# Patient Record
Sex: MALE | Race: OTHER | Employment: FULL TIME | URBAN - METROPOLITAN AREA
[De-identification: names, ages, dates, MRNs, and addresses within clinical notes are randomized per-mention and may not be internally consistent; named-entity substitution may affect disease eponyms.]

---

## 2023-10-24 RX ORDER — PRAVASTATIN SODIUM 40 MG
40 TABLET ORAL DAILY
COMMUNITY
Start: 2023-08-02

## 2023-10-24 RX ORDER — LOSARTAN POTASSIUM 100 MG/1
1 TABLET ORAL DAILY
COMMUNITY
Start: 2023-05-04

## 2023-10-24 RX ORDER — PANTOPRAZOLE SODIUM 40 MG/1
1 TABLET, DELAYED RELEASE ORAL DAILY
COMMUNITY
Start: 2022-12-12

## 2023-10-24 RX ORDER — TAMSULOSIN HYDROCHLORIDE 0.4 MG/1
1 CAPSULE ORAL DAILY
COMMUNITY
Start: 2023-05-08

## 2023-10-24 NOTE — FLOWSHEET NOTE
Preoperative Screening for Elective Surgery/Invasive Procedures While COVID-19 present in the community     Have you tested positive or have been told to self-isolate for COVID-19 like symptoms within the past 28 days? Do you currently have any of the following symptoms? Fever >100.0 F or 99.9 F in immunocompromised patients? New onset cough, shortness of breath or difficulty breathing? New onset sore throat, myalgia (muscle aches and pains), headache, loss of taste/smell or diarrhea? Have you had a potential exposure to COVID-19 within the past 14 days by:  Close contact with a confirmed case? Close contact with a healthcare worker,  or essential infrastructure worker (grocery store, [employer], gas station, public utilities or transportation)? Do you reside in a congregate setting such as; skilled nursing facility, adult home, correctional facility, homeless shelter or other institutional setting? Have you had recent travel to a known COVID-19 hotspot? No to all above       * Admitted with diarrhea? [] YES    [x]  NO     *Prior history of C-Diff. In last 3 months? [] YES    [x]  NO     *Antibiotic use in the past 6-8 weeks? [x]  NO    []  YES      If yes, which: REASON_________________     *Prior hospitalization or nursing home in the last month? []  YES    [x]  NO     [facility] .call before you fall    [address] [place]    Day of Surgery:     11/3/23             Arrival time__1030__________        Surgery time___1200_________    Do not eat  anything after 12:00 midnight prior to your surgery. Nothing to drink within 5 hours of procedure. This includes water chewing gum, mints and ice chips- the Day of Surgery.   You may brush your teeth and gargle the morning of your surgery, but do not swallow the water      You may be asked to stop blood thinners such as Coumadin, Plavix, Fragmin, Lovenox, etc., or any anti-inflammatories such as:  Aspirin, Ibuprofen, Advil, Naproxen prior to your surgery. You may also be asked to stop any OTC medications such as fish oil, vitamin E, glucosamine, garlic, Multivitamins, COQ 10, etc.    We ask that you do not smoke 24 hours prior to surgery  We ask that you do not  drink any alcoholic beverages 24 hours prior to surgery     You must make arrangements for a responsible adult to take you home after your surgery. For your safety you will not be allowed to leave alone or drive yourself home. Your surgery will be cancelled if you do not have a ride home. Also for your safety, it is strongly suggested that someone stay with you the first 24 hours after your surgery. Please do not bring other children with you. For your comfort, please wear simple loose fitting clothing to the hospital.  Please do not bring valuables. Do not wear any make-up or nail polish on your fingers or toes. For your safety, please do not wear any jewelry or body piercing's on the day of surgery. All jewelry must be removed. If you have dentures, they will be removed before going to operating room. For your convenience, we will provide you with a container. If you wear contact lenses or glasses, they will be removed, please bring a case for them. As part of our patient safety program to minimize surgical site infections, we ask you to do the following:    Please notify your surgeon if you develop any illness between         now and the day of your surgery. This includes a cough, cold, fever, sore throat, nausea,         or vomiting, and diarrhea, etc.   Please notify your surgeon if you experience dizziness, shortness         of breath or blurred vision between now and the time of your surgery. Do not shave your operative site 96 hours prior to surgery. For face and neck surgery, men may use an electric razor 48 hours   prior to surgery.     You may shower the night before surgery or the

## 2023-10-24 NOTE — FLOWSHEET NOTE
[facility] Pre-Admission Testing Electronic Communication Worksheet for OR/ENDO Procedures        Patient: Britney Caldwell    DOS: 11/3/23    Arrival Time: 1030    Surgery Time:1200    Meds to Bed:  [] YES    [x]  NO    Transportation Confirmed: [x] YES    []  NO  wife  History and Physical:  [] YES    [x]  NO  [] N/A  If yes, please list doctor or Urgent Care and date of H&P:     Additional Clearance(Cardiac, Pulmonary, etc):  [] YES    [x]  NO    Pre-Admission Testing Visit:  [] YES    [x]  NO If no, do labs/testing need to be done DOS?   [] YES    [x]  NO    Medication Reconciliation Complete:  [x] YES    []  NO        Additional Notes:    GERD  Hx of colon polyps            Interview Complete: [x] YES    []  NO          Chris Clinton RN  4:02 PM

## 2023-11-02 ENCOUNTER — ANESTHESIA EVENT (OUTPATIENT)
Dept: ENDOSCOPY | Age: 68
End: 2023-11-02
Payer: MEDICARE

## 2023-11-03 ENCOUNTER — ANESTHESIA (OUTPATIENT)
Dept: ENDOSCOPY | Age: 68
End: 2023-11-03
Payer: MEDICARE

## 2023-11-03 ENCOUNTER — HOSPITAL ENCOUNTER (OUTPATIENT)
Age: 68
Setting detail: OUTPATIENT SURGERY
Discharge: HOME | End: 2023-11-03
Attending: INTERNAL MEDICINE | Admitting: INTERNAL MEDICINE
Payer: MEDICARE

## 2023-11-03 VITALS
BODY MASS INDEX: 28.73 KG/M2 | WEIGHT: 194 LBS | TEMPERATURE: 41 F | OXYGEN SATURATION: 98 % | SYSTOLIC BLOOD PRESSURE: 136 MMHG | RESPIRATION RATE: 16 BRPM | HEIGHT: 69 IN | DIASTOLIC BLOOD PRESSURE: 83 MMHG | HEART RATE: 70 BPM

## 2023-11-03 PROCEDURE — 3700000001 HC ADD 15 MINUTES (ANESTHESIA): Performed by: INTERNAL MEDICINE

## 2023-11-03 PROCEDURE — 2500000003 HC RX 250 WO HCPCS: Performed by: NURSE ANESTHETIST, CERTIFIED REGISTERED

## 2023-11-03 PROCEDURE — 2580000003 HC RX 258: Performed by: ANESTHESIOLOGY

## 2023-11-03 PROCEDURE — 7100000010 HC PHASE II RECOVERY - FIRST 15 MIN: Performed by: INTERNAL MEDICINE

## 2023-11-03 PROCEDURE — 2709999900 HC NON-CHARGEABLE SUPPLY: Performed by: INTERNAL MEDICINE

## 2023-11-03 PROCEDURE — 6360000002 HC RX W HCPCS: Performed by: NURSE ANESTHETIST, CERTIFIED REGISTERED

## 2023-11-03 PROCEDURE — 3700000000 HC ANESTHESIA ATTENDED CARE: Performed by: INTERNAL MEDICINE

## 2023-11-03 PROCEDURE — 7100000011 HC PHASE II RECOVERY - ADDTL 15 MIN: Performed by: INTERNAL MEDICINE

## 2023-11-03 PROCEDURE — 7100000000 HC PACU RECOVERY - FIRST 15 MIN: Performed by: INTERNAL MEDICINE

## 2023-11-03 PROCEDURE — 3609027000 HC COLONOSCOPY: Performed by: INTERNAL MEDICINE

## 2023-11-03 PROCEDURE — 7100000001 HC PACU RECOVERY - ADDTL 15 MIN: Performed by: INTERNAL MEDICINE

## 2023-11-03 RX ORDER — SODIUM CHLORIDE 9 MG/ML
INJECTION, SOLUTION INTRAVENOUS PRN
Status: DISCONTINUED | OUTPATIENT
Start: 2023-11-03 | End: 2023-11-03 | Stop reason: HOSPADM

## 2023-11-03 RX ORDER — LIDOCAINE HYDROCHLORIDE 20 MG/ML
INJECTION, SOLUTION EPIDURAL; INFILTRATION; INTRACAUDAL; PERINEURAL PRN
Status: DISCONTINUED | OUTPATIENT
Start: 2023-11-03 | End: 2023-11-03 | Stop reason: SDUPTHER

## 2023-11-03 RX ORDER — SODIUM CHLORIDE 0.9 % (FLUSH) 0.9 %
5-40 SYRINGE (ML) INJECTION EVERY 12 HOURS SCHEDULED
Status: DISCONTINUED | OUTPATIENT
Start: 2023-11-03 | End: 2023-11-03 | Stop reason: HOSPADM

## 2023-11-03 RX ORDER — SODIUM CHLORIDE 0.9 % (FLUSH) 0.9 %
5-40 SYRINGE (ML) INJECTION PRN
Status: DISCONTINUED | OUTPATIENT
Start: 2023-11-03 | End: 2023-11-03 | Stop reason: HOSPADM

## 2023-11-03 RX ORDER — ONDANSETRON 2 MG/ML
4 INJECTION INTRAMUSCULAR; INTRAVENOUS
Status: DISCONTINUED | OUTPATIENT
Start: 2023-11-03 | End: 2023-11-03 | Stop reason: HOSPADM

## 2023-11-03 RX ORDER — PROPOFOL 10 MG/ML
INJECTION, EMULSION INTRAVENOUS PRN
Status: DISCONTINUED | OUTPATIENT
Start: 2023-11-03 | End: 2023-11-03 | Stop reason: SDUPTHER

## 2023-11-03 RX ADMIN — PROPOFOL 100 MG: 10 INJECTION, EMULSION INTRAVENOUS at 12:32

## 2023-11-03 RX ADMIN — PROPOFOL 180 MCG/KG/MIN: 10 INJECTION, EMULSION INTRAVENOUS at 12:33

## 2023-11-03 RX ADMIN — SODIUM CHLORIDE: 9 INJECTION, SOLUTION INTRAVENOUS at 11:28

## 2023-11-03 RX ADMIN — LIDOCAINE HYDROCHLORIDE 100 MG: 20 INJECTION, SOLUTION EPIDURAL; INFILTRATION; INTRACAUDAL; PERINEURAL at 12:32

## 2023-11-03 ASSESSMENT — PAIN SCALES - GENERAL
PAINLEVEL_OUTOF10: 0
PAINLEVEL_OUTOF10: 0

## 2023-11-03 ASSESSMENT — PAIN - FUNCTIONAL ASSESSMENT
PAIN_FUNCTIONAL_ASSESSMENT: ACTIVITIES ARE NOT PREVENTED
PAIN_FUNCTIONAL_ASSESSMENT: 0-10

## 2023-11-03 ASSESSMENT — PAIN DESCRIPTION - DESCRIPTORS: DESCRIPTORS: SORE

## 2023-11-03 NOTE — H&P
[facility]   Attending Pre-operative History and Physical      PROCEDURE:  colonoscopy    Indication:The patient is a 79 y.o. male presents for a colonoscopy. Past Medical History:    Past Medical History:   Diagnosis Date    Colon polyp     GERD (gastroesophageal reflux disease)     Hyperlipidemia     Sleep apnea     wears cpap      Past Surgical History:    Past Surgical History:   Procedure Laterality Date    COLONOSCOPY      HERNIA REPAIR      KNEE SURGERY Left     torn meniscus      Medications Prior to Admission:   Prior to Admission medications    Medication Sig Start Date End Date Taking?  Authorizing Provider   losartan (COZAAR) 100 MG tablet Take 1 tablet by mouth daily 23  Yes Victoriano Polo MD   tamsulosin (FLOMAX) 0.4 MG capsule Take 1 capsule by mouth daily 23  Yes Victoriano Polo MD   pantoprazole (PROTONIX) 40 MG tablet Take 1 tablet by mouth daily 22  Yes Victoriano Polo MD   pravastatin (PRAVACHOL) 40 MG tablet Take 1 tablet by mouth daily 23  Yes Victoriano Polo MD        Allergies:  Sulfa antibiotics  History of allergic reaction to anesthesia:  No    Social History:   Social History     Socioeconomic History    Marital status:      Spouse name: Not on file    Number of children: Not on file    Years of education: Not on file    Highest education level: Not on file   Occupational History    Not on file   Tobacco Use    Smoking status: Former     Packs/day: 1.00     Years: 15.00     Additional pack years: 0.00     Total pack years: 15.00     Types: Cigarettes     Quit date: 18     Years since quittin.8    Smokeless tobacco: Never   Vaping Use    Vaping Use: Never used   Substance and Sexual Activity    Alcohol use: Yes     Comment: occ    Drug use: Never    Sexual activity: Defer     Partners: Female   Other Topics Concern    Not on file   Social History Narrative    Not on file     Social Determinants of Health     Financial Resource

## 2023-11-03 NOTE — DISCHARGE INSTRUCTIONS
Discharge Instructions for Colonoscopy     Colonoscopy is a visual exam of the lining of the large intestine, also called the bowel or colon, with a colonoscope. A colonoscope is a flexible tube with a light and a viewing device. It allows the doctor to view the inside of the colon through a tiny video camera. Colonoscopy is performed for many reasons: unexplained anemia , pain, diarrhea , bloody stools, cancer screening, among many other reasons. Complications from a colonoscopy are rare. Some possible serious complications include perforated bowel (which might require surgery) and bleeding (which could require blood transfusion ). Minor complications include bloating, gas, and cramping that can last for 1-2 days after the procedure. Because air is put into your colon during the procedure, it is normal to pass large amounts of air from your rectum. You may not have a bowel movement for 1-3 days after the procedure. What You Will Need:  Someone to drive you home after the procedure     Steps to Take:  [address] when you get home. Because the sedative will make you drowsy, don't drive, operate machinery, or make important decisions the day of the procedure. Feelings of bloating, gas, or cramping may persist for 24 hours. Diet -  Try sips of water first. If tolerated, resume bland food (scrambled eggs, toast, soup) first.  If tolerated, resume regular diet or the diet recommended by your physician. Do not drink alcohol for 24 hours. Physical Activity -  Ask your doctor when you will be able to return to work. Do not drive, operate heavy machinery, or do activities that require coordination or balance for 24 hours. Otherwise, return to your normal routine as soon as you are comfortable to do so, which is usually the next day after the procedure. Medications - When taking medications, it's important to:    Take your medication as directed, not more, not less, not at a different

## 2023-11-03 NOTE — ANESTHESIA PRE PROCEDURE
consumption: 11/01/23      Time of last solid consumption: 1900       Anesthesia Evaluation  Patient summary reviewed   no history of anesthetic complications:   Airway: Mallampati: III  TM distance: >3 FB   Neck ROM: full  Mouth opening: > = 3 FB   Dental: normal exam         Pulmonary:normal exam    (+)     sleep apnea:                                  Cardiovascular:  Exercise tolerance: good (>4 METS)  (+) hypertension:, hyperlipidemia      ECG reviewed  Rhythm: regular  Rate: normal           Beta Blocker:  Not on Beta Blocker         Neuro/Psych:   Negative Neuro/Psych ROS              GI/Hepatic/Renal:   (+) GERD: well controlled, bowel prep          Endo/Other: Negative Endo/Other ROS                    Abdominal: normal exam            Vascular: negative vascular ROS. Other Findings:             Anesthesia Plan      MAC     ASA 3       Induction: intravenous. Anesthetic plan and risks discussed with patient and spouse. Plan discussed with CRNA. This pre-anesthesia assessment may be used as a history and physical.    DOS STAFF ADDENDUM:    Pt seen and examined, chart reviewed (including anesthesia, drug and allergy history). No interval changes to history and physical examination. Anesthetic plan, risks, benefits, alternatives, and personnel involved discussed with patient. Questions and concerns addressed. Patient(family) verbalized an understanding and agrees to proceed.       Charis Aceves MD  November 3, 2023  11:26 AM

## (undated) DEVICE — ENDOSCOPY KIT: Brand: MEDLINE INDUSTRIES, INC.